# Patient Record
Sex: FEMALE | Race: WHITE | Employment: UNEMPLOYED | URBAN - METROPOLITAN AREA
[De-identification: names, ages, dates, MRNs, and addresses within clinical notes are randomized per-mention and may not be internally consistent; named-entity substitution may affect disease eponyms.]

---

## 2023-03-10 ENCOUNTER — OFFICE VISIT (OUTPATIENT)
Dept: SURGERY | Age: 53
End: 2023-03-10
Payer: COMMERCIAL

## 2023-03-10 VITALS — HEIGHT: 66 IN | WEIGHT: 225 LBS | BODY MASS INDEX: 36.16 KG/M2

## 2023-03-10 DIAGNOSIS — D24.2 PAPILLOMA OF LEFT BREAST: Primary | ICD-10-CM

## 2023-03-10 PROCEDURE — 99203 OFFICE O/P NEW LOW 30 MIN: CPT | Performed by: SURGERY

## 2023-03-10 PROCEDURE — 76642 ULTRASOUND BREAST LIMITED: CPT | Performed by: SURGERY

## 2023-03-10 RX ORDER — BUPROPION HYDROCHLORIDE 150 MG/1
TABLET, EXTENDED RELEASE ORAL
COMMUNITY
Start: 2023-03-02

## 2023-03-10 NOTE — PROGRESS NOTES
HISTORY OF PRESENT ILLNESS  Evi Soliz is a 48 y.o. female. Breast Problem    NEW patient consult referred by  Dr. 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 Found on imaging but has has soreness and discomfort in the LEFT axilla for about 10-15 years. Denies pain in the RIGHT breast.            2/21/23-   RIGHT breast biopsy- Fibroadenoma   LEFT breast biopsy- fragmented papilloma with no atypia            Family History:  No breast or ovarian cancer             Mammogram, [address] 2/15/23, BIRADS 4        Past Medical History:   Diagnosis Date    Kidney stones        Past Surgical History:   Procedure Laterality Date    HX CHOLECYSTECTOMY  1999    HX GYN  2011    ablation       Social History     Socioeconomic History    Marital status:      Spouse name: Not on file    Number of children: Not on file    Years of education: Not on file    Highest education level: Not on file   Occupational History    Not on file   Tobacco Use    Smoking status: Never    Smokeless tobacco: Not on file   Substance and Sexual Activity    Alcohol use: Not on file    Drug use: Not on file    Sexual activity: Not on file   Other Topics Concern    Not on file   Social History Narrative    Not on file     Social Determinants of Health     Financial Resource Strain: Not on file   Food Insecurity: Not on file   Transportation Needs: Not on file   Physical Activity: Not on file   Stress: Not on file   Social Connections: Not on file   Intimate Partner Violence: Not on file   Housing Stability: Not on file       Current Outpatient Medications on File Prior to Visit   Medication Sig Dispense Refill    buPROPion SR (WELLBUTRIN SR) 150 mg SR tablet Take 1 tablet twice a day by oral route. oxyCODONE-acetaminophen (PERCOCET) 5-325 mg per tablet Take 1 tablet by mouth every four (4) hours as needed for Pain.  (Patient not taking: Reported on 3/10/2023) 14 tablet 0     No current facility-administered medications on file prior to visit. No Known Allergies    OB History    No obstetric history on file. Obstetric Comments   Menarche 15, LMP 2010, # of children 1, age of 4st delivery 27, Hysterectomy/oophorectomy No/No, Breast bx Yes, history of breast feeding Yes, BCP Yes, Hormone therapy No                ROS      Physical Exam  Exam conducted with a chaperone present. Constitutional:       Appearance: She is well-developed. She is not diaphoretic. HENT:      Head: Normocephalic and atraumatic. Right Ear: External ear normal.      Left Ear: External ear normal.   Eyes:      General: No scleral icterus. Right eye: No discharge. Left eye: No discharge. Pupils: Pupils are equal, round, and reactive to light. Neck:      Thyroid: No thyromegaly. Vascular: No JVD. Trachea: No tracheal deviation. Cardiovascular:      Rate and Rhythm: Normal rate and regular rhythm. Heart sounds: Normal heart sounds. Pulmonary:      Effort: Pulmonary effort is normal. No tachypnea, accessory muscle usage or respiratory distress. Breath sounds: Normal breath sounds. No stridor. Chest:   Breasts:     Breasts are symmetrical.      Right: No inverted nipple, mass, nipple discharge, skin change or tenderness. Left: No inverted nipple, mass, nipple discharge, skin change or tenderness. Abdominal:      General: There is no distension. Palpations: Abdomen is soft. There is no mass. Tenderness: There is no abdominal tenderness. Musculoskeletal:         General: Normal range of motion. Cervical back: Normal range of motion and neck supple. Lymphadenopathy:      Cervical: No cervical adenopathy. Skin:     General: Skin is warm and dry. Neurological:      Mental Status: She is alert and oriented to person, place, and time. Psychiatric:         Speech: Speech normal.         Behavior: Behavior normal.         Thought Content:  Thought content normal. Judgment: Judgment normal.           Imaging & Procedures  BREAST ULTRASOUND  Indication: LEFT breast mass  Technique: The area was scanned using a high-frequency linear-array near-field transducer  Findings: Questionable biopsy site 10:00, 9cm from nipple  Impression: Probable intraductal papilloma  Disposition: Discussed biopsy or excision; pt has elected for excision. ASSESSMENT and PLAN    ICD-10-CM ICD-9-CM    1. Papilloma of left breast  D24.2 217         New patient presents for evaluation of LEFT breast papilloma, and is doing well overall. Upon physical examination of the LEFT breast noted nothing palpable. Has questionable biopsy site 10:00, 9 cm from nipple. Will schedule for ductal excision with mag seed or wire localization. Follow up post-op. This plan was reviewed with the patient and patient agrees. All questions were answered. Total time spent was 40 minutes.         Written by Leo Fernandez, as dictated by Dr. Alma Rosa Evans MD.

## 2023-03-10 NOTE — PROGRESS NOTES
HISTORY OF PRESENT ILLNESS  Dione Levin is a 48 y.o. female. HPI NEW patient consult referred by  Dr. 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 Found on imaging but has has soreness and discomfort in the LEFT axilla for about 10-15 years.  Denies pain in the RIGHT breast.     2/21/23-   RIGHT breast biopsy- Fibroadenoma   LEFT breast biopsy- fragmented papilloma with no atypia      Family History:  No breast or ovarian cancer       Mammogram, [address] 2/15/23, BIRADS 4      ROS    Physical Exam    ASSESSMENT and PLAN  {ASSESSMENT/PLAN:00019}

## 2023-03-10 NOTE — LETTER
3/29/2023 9:26 AM    Patient:  Nazia Beth   YOB: 1970  Date of Visit: 3/10/2023      Dear Dr. Edita Gibson:      Thank you for referring Ms. Nazia Beth to me for evaluation/treatment. Below are the relevant portions of my assessment and plan of care. If you have questions, please do not hesitate to call me. I look forward to following Ms. Karley Farris along with you.         Sincerely,      Ara Issa MD

## 2023-05-20 RX ORDER — OXYCODONE HYDROCHLORIDE AND ACETAMINOPHEN 5; 325 MG/1; MG/1
1 TABLET ORAL EVERY 4 HOURS PRN
COMMUNITY
Start: 2014-12-03

## 2023-05-20 RX ORDER — BUPROPION HYDROCHLORIDE 150 MG/1
TABLET, EXTENDED RELEASE ORAL
COMMUNITY
Start: 2023-03-02